# Patient Record
Sex: FEMALE | Race: WHITE
[De-identification: names, ages, dates, MRNs, and addresses within clinical notes are randomized per-mention and may not be internally consistent; named-entity substitution may affect disease eponyms.]

---

## 2017-07-05 ENCOUNTER — HOSPITAL ENCOUNTER (EMERGENCY)
Dept: HOSPITAL 57 - BURERS | Age: 37
LOS: 1 days | Discharge: LEFT BEFORE DISCHARGE | End: 2017-07-06
Payer: COMMERCIAL

## 2017-07-05 DIAGNOSIS — F17.210: ICD-10-CM

## 2017-07-05 DIAGNOSIS — R20.0: Primary | ICD-10-CM

## 2017-07-05 DIAGNOSIS — F41.9: ICD-10-CM

## 2017-07-05 LAB
ALBUMIN SERPL BCG-MCNC: 4.3 G/DL (ref 3.5–5)
ALP SERPL-CCNC: 64 U/L (ref 40–150)
ALT SERPL W P-5'-P-CCNC: 15 U/L (ref 8–55)
ANION GAP SERPL CALC-SCNC: 16 MMOL/L (ref 10–20)
APTT PPP: 34 SEC (ref 22.9–36.1)
AST SERPL-CCNC: 14 U/L (ref 5–34)
BACTERIA UR QL AUTO: (no result) HPF
BASOPHILS # BLD AUTO: 0.1 THOU/UL (ref 0–0.2)
BASOPHILS NFR BLD AUTO: 0.8 % (ref 0–1)
BILIRUB SERPL-MCNC: 0.3 MG/DL (ref 0.2–1.2)
BUN SERPL-MCNC: 8 MG/DL (ref 7–18.7)
CALCIUM SERPL-MCNC: 9.7 MG/DL (ref 7.8–10.44)
CHLORIDE SERPL-SCNC: 104 MMOL/L (ref 98–107)
CO2 SERPL-SCNC: 24 MMOL/L (ref 22–29)
CREAT CL PREDICTED SERPL C-G-VRATE: 0 ML/MIN (ref 70–130)
EOSINOPHIL # BLD AUTO: 0.7 THOU/UL (ref 0–0.7)
EOSINOPHIL NFR BLD AUTO: 4.5 % (ref 0–10)
GLOBULIN SER CALC-MCNC: 3.4 G/DL (ref 2.4–3.5)
GLUCOSE SERPL-MCNC: 91 MG/DL (ref 70–105)
HGB BLD-MCNC: 15.3 G/DL (ref 12–16)
INR PPP: 1.1
LYMPHOCYTES # BLD AUTO: 5.6 THOU/UL (ref 1.2–3.4)
LYMPHOCYTES NFR BLD AUTO: 35.8 % (ref 21–51)
MCH RBC QN AUTO: 31.6 PG (ref 27–31)
MCV RBC AUTO: 93 FL (ref 81–99)
MONOCYTES # BLD AUTO: 1.2 THOU/UL (ref 0.11–0.59)
MONOCYTES NFR BLD AUTO: 7.6 % (ref 0–10)
NEUTROPHILS # BLD AUTO: 8.1 THOU/UL (ref 1.4–6.5)
NEUTROPHILS NFR BLD AUTO: 51.3 % (ref 42–75)
PLATELET # BLD AUTO: 329 THOU/UL (ref 130–400)
POTASSIUM SERPL-SCNC: 3.8 MMOL/L (ref 3.5–5.1)
PREGS CONTROL BACKGROUND?: (no result)
PREGS CONTROL BAR APPEAR?: YES
PROTHROMBIN TIME: 14.3 SEC (ref 12–14.7)
RBC # BLD AUTO: 4.85 MILL/UL (ref 4.2–5.4)
RBC UR QL AUTO: (no result) HPF (ref 0–3)
SODIUM SERPL-SCNC: 140 MMOL/L (ref 136–145)
SP GR UR STRIP: 1.01 (ref 1–1.04)
WBC # BLD AUTO: 15.7 THOU/UL (ref 4.8–10.8)
WBC UR QL AUTO: (no result) HPF (ref 0–3)

## 2017-07-05 PROCEDURE — 71275 CT ANGIOGRAPHY CHEST: CPT

## 2017-07-05 PROCEDURE — 84703 CHORIONIC GONADOTROPIN ASSAY: CPT

## 2017-07-05 PROCEDURE — 81015 MICROSCOPIC EXAM OF URINE: CPT

## 2017-07-05 PROCEDURE — 93005 ELECTROCARDIOGRAM TRACING: CPT

## 2017-07-05 PROCEDURE — 84484 ASSAY OF TROPONIN QUANT: CPT

## 2017-07-05 PROCEDURE — 85025 COMPLETE CBC W/AUTO DIFF WBC: CPT

## 2017-07-05 PROCEDURE — 36416 COLLJ CAPILLARY BLOOD SPEC: CPT

## 2017-07-05 PROCEDURE — 82553 CREATINE MB FRACTION: CPT

## 2017-07-05 PROCEDURE — 96374 THER/PROPH/DIAG INJ IV PUSH: CPT

## 2017-07-05 PROCEDURE — 80053 COMPREHEN METABOLIC PANEL: CPT

## 2017-07-05 PROCEDURE — 70450 CT HEAD/BRAIN W/O DYE: CPT

## 2017-07-05 PROCEDURE — 85730 THROMBOPLASTIN TIME PARTIAL: CPT

## 2017-07-05 PROCEDURE — 85610 PROTHROMBIN TIME: CPT

## 2017-07-05 PROCEDURE — 81003 URINALYSIS AUTO W/O SCOPE: CPT

## 2017-07-05 NOTE — CT
CT OF THE BRAIN WITHOUT CONTRAST:

7/5/17

 

A noncontrast CT was done for evaluation of facial and hand numbness. The ventricles are normal in s
ize with no shift. No intracranial bleeding, mass or sign of acute stroke was found. There is good g
ray-white differentiation. The calvarium appears normal. The sphenoid sinus and mastoid air cells ar
e clear. 

 

IMPRESSION:  

No acute intracranial findings. 

 

POS: HOME

## 2017-07-06 LAB
CK MB SERPL-MCNC: 0.5 NG/ML (ref 0–6.6)
TROPONIN I SERPL DL<=0.01 NG/ML-MCNC: (no result) NG/ML (ref ?–0.03)

## 2017-08-25 ENCOUNTER — HOSPITAL ENCOUNTER (EMERGENCY)
Dept: HOSPITAL 57 - BURERS | Age: 37
Discharge: HOME | End: 2017-08-25
Payer: COMMERCIAL

## 2017-08-25 DIAGNOSIS — S60.462A: Primary | ICD-10-CM
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
## 2017-08-25 DIAGNOSIS — F17.210: ICD-10-CM

## 2017-08-25 DIAGNOSIS — F41.9: ICD-10-CM

## 2017-08-25 PROCEDURE — 99282 EMERGENCY DEPT VISIT SF MDM: CPT

## 2018-11-24 ENCOUNTER — HOSPITAL ENCOUNTER (EMERGENCY)
Dept: HOSPITAL 57 - BURERS | Age: 38
Discharge: HOME | End: 2018-11-24
Payer: COMMERCIAL

## 2018-11-24 DIAGNOSIS — F17.210: ICD-10-CM

## 2018-11-24 DIAGNOSIS — J98.01: Primary | ICD-10-CM

## 2018-11-24 DIAGNOSIS — Z71.6: ICD-10-CM

## 2018-11-24 DIAGNOSIS — F41.9: ICD-10-CM

## 2018-11-24 PROCEDURE — 87804 INFLUENZA ASSAY W/OPTIC: CPT

## 2018-11-24 PROCEDURE — 99406 BEHAV CHNG SMOKING 3-10 MIN: CPT

## 2018-11-24 PROCEDURE — 94640 AIRWAY INHALATION TREATMENT: CPT

## 2018-11-24 PROCEDURE — 96372 THER/PROPH/DIAG INJ SC/IM: CPT

## 2019-09-07 ENCOUNTER — HOSPITAL ENCOUNTER (EMERGENCY)
Dept: HOSPITAL 57 - BURERS | Age: 39
Discharge: HOME | End: 2019-09-07
Payer: COMMERCIAL

## 2019-09-07 DIAGNOSIS — S91.332A: Primary | ICD-10-CM
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
## 2019-09-07 DIAGNOSIS — F32.9: ICD-10-CM

## 2019-09-07 DIAGNOSIS — F17.210: ICD-10-CM

## 2019-09-07 PROCEDURE — 90715 TDAP VACCINE 7 YRS/> IM: CPT

## 2019-09-07 PROCEDURE — 90471 IMMUNIZATION ADMIN: CPT

## 2020-02-01 ENCOUNTER — HOSPITAL ENCOUNTER (EMERGENCY)
Dept: HOSPITAL 57 - BURERS | Age: 40
Discharge: HOME | End: 2020-02-01
Payer: SELF-PAY

## 2020-02-01 DIAGNOSIS — M25.562: Primary | ICD-10-CM
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
## 2020-02-01 DIAGNOSIS — F32.9: ICD-10-CM

## 2020-02-01 DIAGNOSIS — F17.210: ICD-10-CM

## 2020-02-01 NOTE — RAD
LEFT KNEE FOUR VIEWS:

2/1/20

 

No fracture was seen. A joint effusion is present. The articular surfaces are smooth.

 

IMPRESSION: 

Small joint effusion. 

 

POS: HOME

## 2020-11-04 ENCOUNTER — HOSPITAL ENCOUNTER (OUTPATIENT)
Dept: HOSPITAL 92 - LABBT | Age: 40
Discharge: HOME | End: 2020-11-04
Attending: OBSTETRICS & GYNECOLOGY
Payer: COMMERCIAL

## 2020-11-04 DIAGNOSIS — N92.1: ICD-10-CM

## 2020-11-04 DIAGNOSIS — Z20.828: ICD-10-CM

## 2020-11-04 DIAGNOSIS — Z01.818: Primary | ICD-10-CM

## 2020-11-04 LAB
HGB BLD-MCNC: 14.8 G/DL (ref 12–16)
MCH RBC QN AUTO: 30.6 PG (ref 27–33)
MCV RBC AUTO: 92.1 FL (ref 80–100)
PLATELET # BLD AUTO: 349 10X3/UL (ref 130–400)
PREGS CONTROL BACKGROUND?: (no result)
PREGS CONTROL BAR APPEAR?: YES
RBC # BLD AUTO: 4.83 10X6/UL (ref 3.9–5.2)
WBC # BLD AUTO: 9.3 10X3/UL (ref 4.5–11)

## 2020-11-04 PROCEDURE — U0003 INFECTIOUS AGENT DETECTION BY NUCLEIC ACID (DNA OR RNA); SEVERE ACUTE RESPIRATORY SYNDROME CORONAVIRUS 2 (SARS-COV-2) (CORONAVIRUS DISEASE [COVID-19]), AMPLIFIED PROBE TECHNIQUE, MAKING USE OF HIGH THROUGHPUT TECHNOLOGIES AS DESCRIBED BY CMS-2020-01-R: HCPCS

## 2020-11-04 PROCEDURE — 87635 SARS-COV-2 COVID-19 AMP PRB: CPT

## 2020-11-04 PROCEDURE — 84703 CHORIONIC GONADOTROPIN ASSAY: CPT

## 2020-11-04 PROCEDURE — 85027 COMPLETE CBC AUTOMATED: CPT

## 2020-11-09 ENCOUNTER — HOSPITAL ENCOUNTER (OUTPATIENT)
Dept: HOSPITAL 92 - SDC | Age: 40
Discharge: HOME | End: 2020-11-09
Attending: OBSTETRICS & GYNECOLOGY
Payer: COMMERCIAL

## 2020-11-09 VITALS — BODY MASS INDEX: 39.6 KG/M2

## 2020-11-09 DIAGNOSIS — E66.9: ICD-10-CM

## 2020-11-09 DIAGNOSIS — F17.210: ICD-10-CM

## 2020-11-09 DIAGNOSIS — Z88.5: ICD-10-CM

## 2020-11-09 DIAGNOSIS — A63.0: ICD-10-CM

## 2020-11-09 DIAGNOSIS — N88.8: ICD-10-CM

## 2020-11-09 DIAGNOSIS — Z79.899: ICD-10-CM

## 2020-11-09 DIAGNOSIS — N83.12: Primary | ICD-10-CM

## 2020-11-09 DIAGNOSIS — M19.90: ICD-10-CM

## 2020-11-09 DIAGNOSIS — N80.0: ICD-10-CM

## 2020-11-09 PROCEDURE — 86900 BLOOD TYPING SEROLOGIC ABO: CPT

## 2020-11-09 PROCEDURE — S0028 INJECTION, FAMOTIDINE, 20 MG: HCPCS

## 2020-11-09 PROCEDURE — 0UT54ZZ RESECTION OF RIGHT FALLOPIAN TUBE, PERCUTANEOUS ENDOSCOPIC APPROACH: ICD-10-PCS | Performed by: OBSTETRICS & GYNECOLOGY

## 2020-11-09 PROCEDURE — 0UT94ZZ RESECTION OF UTERUS, PERCUTANEOUS ENDOSCOPIC APPROACH: ICD-10-PCS | Performed by: OBSTETRICS & GYNECOLOGY

## 2020-11-09 PROCEDURE — 0UT24ZZ RESECTION OF BILATERAL OVARIES, PERCUTANEOUS ENDOSCOPIC APPROACH: ICD-10-PCS | Performed by: OBSTETRICS & GYNECOLOGY

## 2020-11-09 PROCEDURE — 86850 RBC ANTIBODY SCREEN: CPT

## 2020-11-09 PROCEDURE — 88307 TISSUE EXAM BY PATHOLOGIST: CPT

## 2020-11-09 PROCEDURE — 86901 BLOOD TYPING SEROLOGIC RH(D): CPT

## 2020-11-09 PROCEDURE — S0020 INJECTION, BUPIVICAINE HYDRO: HCPCS

## 2020-11-10 NOTE — OP
DATE OF PROCEDURE:  11/09/2020



PREOPERATIVE DIAGNOSES:  

1. Pelvic pain.

2. Menorrhagia.

3. Human papilloma virus.



POSTOPERATIVE DIAGNOSES:  

1. Pelvic pain.

2. Menorrhagia.

3. Human papilloma virus.



PROCEDURE PERFORMED:  Robotic-assisted total laparoscopic hysterectomy, left

salpingo-oophorectomy, and right salpingectomy. 



ANESTHESIA:  General endotracheal.



ASSISTANT SURGEON:  Ximena Villalta PA-C



ESTIMATED BLOOD LOSS:  20 mL.



IVF:  1500 mL of crystalloid.



URINE OUTPUT:  250 mL clear urine.



PATHOLOGY:  Uterus, cervix, bilateral fallopian tubes, and left ovary.



FINDINGS:  The uterus measured 8 cm.  Normal-appearing cervix and vaginal mucosa.

Physiologic adhesions of the sigmoid to the pelvic sidewall on the left.  Normal

left and right ovaries and fallopian tubes.  Concluding the procedure, there was

excellent hemostasis and ureter and bladders were noted to be intact. 



COMPLICATIONS:  None.



DRAINS:  Conner catheter.



DESCRIPTION OF PROCEDURE:  The patient was taken to the operating room, where

general anesthesia was obtained without difficulty.  The patient was prepped and

draped in a sterile fashion in dorsal lithotomy position.  A Conner catheter was

placed in the bladder.  A speculum was placed in the vagina.  The anterior lip of

the cervix was grasped with a single-tooth tenaculum.  The cervix was then

progressively dilated with Robert dilators and the uterus then sounded to 8 cm.  The

CLAUDIA manipulator was assembled with a 4 cm colpotomizer ring and an 8 cm tip.  The

manipulator was inserted into the uterus.  The tenaculum and speculum were removed

out of the vagina.  Balloons were inflated and colpotomizer ring was advanced, fit

snugly around the cervix.  Legs were placed in low lithotomy.  Attention was turned

to the abdomen.  A mixture of 0.5% Marcaine and 1% lidocaine with epi were

infiltrated into the umbilicus.  A 12 mm skin incision was made.  A Veress needle

was passed into the abdomen several times, initially getting high pressures.  On the

secondary try, the pressure was noted to be 2 mmHg and pneumoperitoneum was

obtained.  A 12 mm trocar was advanced into the abdomen and confirmed placement with

a robotic camera.  Steep Trendelenburg was obtained.  Right and left lower quadrant

8 mm robotic trocars were placed under direct visualization after infiltrating with

anesthetic mixture.  A right upper quadrant 11 mm port was also placed under direct

visualization after infiltrating with anesthetic.  The robot was then docked.  The

right robotic arm contained monopolar scissors.  Left robotic arm contained a

fenestrated bipolar.  During initial insufflation and visualization with the consult

under control, there was noted to be intermittent preperitoneal insufflation and

this was because the balloon on the camera trocar was actually inflated in the

preperitoneal space.  Therefore, the camera was undocked and the trocar was advanced

further into the abdomen to secure its place intraperitoneally and this resolved the

problem.  At that time, the left fallopian tube and ovary were grasped and elevated.

 The IP ligament was identified.  The physiologic adhesions that were filmy to the

pelvic brim and sidewall were taken down to further visualize the IP at the pelvic

brim as well as the ureter, which was done so easily.  The IP was then cauterized

just below the ovary multiple times and incised with the scissors on cautery and the

mesovarium was cauterized with a fenestrator and incised with the scissors until it

met the midportion of the round ligament that was cauterized and then incised.  The

posterior leaf of the broad ligament was incised down to the uterosacral.  The

retroperitoneum was bluntly brushed away from the uterine vessels.  The anterior

leaf of the broad ligament was also incised down to the level of the vesicouterine

peritoneum and the vessels on the right side were adequately skeletonized with

excellent visualization of the vessels themselves as well as identifying the ureter

in the pelvic sidewall where it crossed underneath the uterine artery. 



Attention was turned to the right side where the right fallopian tube was grasped

and elevated and just below the fallopian tube was cauterized and incised with the

scissors and this was done so from lateral to medial until the medial portion was

met of the fallopian tube that was clamped across, cauterized, incised, and then

removed out of the abdomen.  The utero-ovarian on the right side was cauterized

multiple times and incised with the scissors sequentially down to the midportion of

the round ligament that was cauterized and incised.  The anterior leaf of the broad

ligament was incised as well as posteriorly.  Again, the retroperitoneum was

dissected off this side with brushing and sweeping motion with a fenestrator and

then the vessels were adequately skeletonized for excellent visualization to achieve

hemostasis easily.  The ureter was then identified in the pelvic sidewall on the

right side and following this, the bladder flap was created ensuring a clear window

by undermining with the fenestrator and then incising with the scissors.  The

bladder easily was taken down off the lower uterine segment and the pubocervical

fascia carefully incising only supporting structures, not the actual bladder.  Once

this had been done below the colpotomizer ring, the pubocervical fascia was scored

on with the scissors on cautery and further abdomen tissue was dissected down below

the colpotomizer ring.  At that point, the uterine pedicle on both sides was

cauterized multiple times with a fenestrator.  Colpotomy was then performed and once

the vascular pedicle was met, this was then incised just above the internal os and

then taken down by incising on its medial portion where the cardinal ligament was to

allow a pedicle to not be retracted and sutured excellent hemostasis.  The colpotomy

was then completed and the uterus was pulled back into the vagina as a means to

maintain pneumoperitoneum.  The scissors were traded out for the needle  and

the vaginal cuff was irrigated copiously and hemostasis was achieved with a

fenestrator.  The vaginal cuff was then reapproximated with a barbed 2-0 PDS suture

in a running fashion incorporating vaginal mucosa and posterior peritoneum in each

bite and this was also run back for a second layer with the same suture.  The needle

was then cut and removed out of the abdomen.  Irrigation was performed of the pelvis

copiously and there was some oozing over the bladder flap and therefore FloSeal was

called for.  FloSeal was then placed over the vaginal cuff and the areas on the

bladder flap that had been oozing.  However, by this time, they were hemostatic.

Low pressure check was performed and noted to have excellent hemostasis.  The

ureters were noted to be intact and vermiculating in the sidewall as well as the

bladder.  All instruments were then removed out of the abdomen and pneumoperitoneum

was released.  The robot was undocked.  The fascia of the umbilical port was

reapproximated with 0 Vicryl in a figure-of-eight fashion.  The skin was closed of

all the sites with 4-0 Monocryl in a subcuticular fashion.  Dermabond was applied.

All instruments and specimens were removed out of the vagina.  The vaginal cuff was

checked and noted to be hemostatic.  The patient tolerated the procedure well.

Sponge, lap, and needle counts were correct x2.  The patient was taken to recovery

room in stable condition.  The patient received Ancef 2 g prior to the procedure. 







Job ID:  [id number]

## 2021-03-15 ENCOUNTER — HOSPITAL ENCOUNTER (OUTPATIENT)
Dept: HOSPITAL 92 - BICCT | Age: 41
Discharge: HOME | End: 2021-03-15
Attending: OBSTETRICS & GYNECOLOGY
Payer: COMMERCIAL

## 2021-03-15 DIAGNOSIS — R10.9: Primary | ICD-10-CM

## 2021-03-15 PROCEDURE — 74177 CT ABD & PELVIS W/CONTRAST: CPT

## 2021-03-22 ENCOUNTER — HOSPITAL ENCOUNTER (OUTPATIENT)
Dept: HOSPITAL 92 - LABBT | Age: 41
Discharge: HOME | End: 2021-03-22
Attending: OBSTETRICS & GYNECOLOGY
Payer: COMMERCIAL

## 2021-03-22 DIAGNOSIS — Z20.822: ICD-10-CM

## 2021-03-22 DIAGNOSIS — Z01.812: Primary | ICD-10-CM

## 2021-03-22 LAB
HGB BLD-MCNC: 16.3 G/DL (ref 12–15.5)
MCH RBC QN AUTO: 31.7 PG (ref 27–33)
MCV RBC AUTO: 94 FL (ref 81.6–98.3)
PLATELET # BLD AUTO: 321 10X3/UL (ref 150–450)
PREGS CONTROL BACKGROUND?: (no result)
PREGS CONTROL BAR APPEAR?: YES
RBC # BLD AUTO: 5.14 10X6/UL (ref 3.9–5.03)
WBC # BLD AUTO: 12.2 10X3/UL (ref 3.5–10.5)

## 2021-03-22 PROCEDURE — U0003 INFECTIOUS AGENT DETECTION BY NUCLEIC ACID (DNA OR RNA); SEVERE ACUTE RESPIRATORY SYNDROME CORONAVIRUS 2 (SARS-COV-2) (CORONAVIRUS DISEASE [COVID-19]), AMPLIFIED PROBE TECHNIQUE, MAKING USE OF HIGH THROUGHPUT TECHNOLOGIES AS DESCRIBED BY CMS-2020-01-R: HCPCS

## 2021-03-22 PROCEDURE — U0005 INFEC AGEN DETEC AMPLI PROBE: HCPCS

## 2021-03-22 PROCEDURE — 84703 CHORIONIC GONADOTROPIN ASSAY: CPT

## 2021-03-22 PROCEDURE — 87635 SARS-COV-2 COVID-19 AMP PRB: CPT

## 2021-03-22 PROCEDURE — 86900 BLOOD TYPING SEROLOGIC ABO: CPT

## 2021-03-22 PROCEDURE — 85027 COMPLETE CBC AUTOMATED: CPT

## 2021-03-22 PROCEDURE — 86850 RBC ANTIBODY SCREEN: CPT

## 2021-03-22 PROCEDURE — 86901 BLOOD TYPING SEROLOGIC RH(D): CPT

## 2021-03-24 ENCOUNTER — HOSPITAL ENCOUNTER (OUTPATIENT)
Dept: HOSPITAL 92 - SDC | Age: 41
Discharge: HOME | End: 2021-03-24
Attending: OBSTETRICS & GYNECOLOGY
Payer: COMMERCIAL

## 2021-03-24 VITALS — BODY MASS INDEX: 39.1 KG/M2

## 2021-03-24 DIAGNOSIS — F17.210: ICD-10-CM

## 2021-03-24 DIAGNOSIS — N83.201: ICD-10-CM

## 2021-03-24 DIAGNOSIS — Z88.1: ICD-10-CM

## 2021-03-24 DIAGNOSIS — Z90.710: ICD-10-CM

## 2021-03-24 DIAGNOSIS — Z88.5: ICD-10-CM

## 2021-03-24 DIAGNOSIS — Z79.899: ICD-10-CM

## 2021-03-24 DIAGNOSIS — N94.89: Primary | ICD-10-CM

## 2021-03-24 DIAGNOSIS — N73.6: ICD-10-CM

## 2021-03-24 DIAGNOSIS — Z88.2: ICD-10-CM

## 2021-03-24 PROCEDURE — S0028 INJECTION, FAMOTIDINE, 20 MG: HCPCS

## 2021-03-24 PROCEDURE — 0UB44ZX EXCISION OF UTERINE SUPPORTING STRUCTURE, PERCUTANEOUS ENDOSCOPIC APPROACH, DIAGNOSTIC: ICD-10-PCS | Performed by: OBSTETRICS & GYNECOLOGY

## 2021-03-24 PROCEDURE — 88312 SPECIAL STAINS GROUP 1: CPT

## 2021-03-24 PROCEDURE — 0DNU4ZZ RELEASE OMENTUM, PERCUTANEOUS ENDOSCOPIC APPROACH: ICD-10-PCS | Performed by: OBSTETRICS & GYNECOLOGY

## 2021-03-24 PROCEDURE — 88305 TISSUE EXAM BY PATHOLOGIST: CPT

## 2021-03-24 PROCEDURE — S0020 INJECTION, BUPIVICAINE HYDRO: HCPCS

## 2021-06-08 ENCOUNTER — HOSPITAL ENCOUNTER (OUTPATIENT)
Dept: HOSPITAL 92 - LABBT | Age: 41
Discharge: HOME | End: 2021-06-08
Attending: SURGERY
Payer: COMMERCIAL

## 2021-06-08 DIAGNOSIS — Z20.822: ICD-10-CM

## 2021-06-08 DIAGNOSIS — Z01.812: Primary | ICD-10-CM

## 2021-06-08 DIAGNOSIS — K40.90: ICD-10-CM

## 2021-06-08 LAB
BASOPHILS # BLD AUTO: 0.1 10X3/UL (ref 0–0.2)
BASOPHILS NFR BLD AUTO: 0.8 % (ref 0–2)
EOSINOPHIL # BLD AUTO: 0.6 10X3/UL (ref 0–0.5)
EOSINOPHIL NFR BLD AUTO: 5.9 % (ref 0–6)
HGB BLD-MCNC: 15 G/DL (ref 12–15.5)
LYMPHOCYTES NFR BLD AUTO: 41.4 % (ref 18–47)
MCH RBC QN AUTO: 31.5 PG (ref 27–33)
MCV RBC AUTO: 94.1 FL (ref 81.6–98.3)
MONOCYTES # BLD AUTO: 0.8 10X3/UL (ref 0–1.1)
MONOCYTES NFR BLD AUTO: 7.8 % (ref 0–10)
NEUTROPHILS # BLD AUTO: 4.6 10X3/UL (ref 1.5–8.4)
NEUTROPHILS NFR BLD AUTO: 43.8 % (ref 40–75)
PLATELET # BLD AUTO: 337 10X3/UL (ref 150–450)
RBC # BLD AUTO: 4.76 10X6/UL (ref 3.9–5.03)
WBC # BLD AUTO: 10.4 10X3/UL (ref 3.5–10.5)

## 2021-06-08 PROCEDURE — U0005 INFEC AGEN DETEC AMPLI PROBE: HCPCS

## 2021-06-08 PROCEDURE — U0003 INFECTIOUS AGENT DETECTION BY NUCLEIC ACID (DNA OR RNA); SEVERE ACUTE RESPIRATORY SYNDROME CORONAVIRUS 2 (SARS-COV-2) (CORONAVIRUS DISEASE [COVID-19]), AMPLIFIED PROBE TECHNIQUE, MAKING USE OF HIGH THROUGHPUT TECHNOLOGIES AS DESCRIBED BY CMS-2020-01-R: HCPCS

## 2021-06-08 PROCEDURE — 85025 COMPLETE CBC W/AUTO DIFF WBC: CPT

## 2021-06-11 ENCOUNTER — HOSPITAL ENCOUNTER (OUTPATIENT)
Dept: HOSPITAL 92 - SDC | Age: 41
Discharge: HOME | End: 2021-06-11
Attending: SURGERY
Payer: COMMERCIAL

## 2021-06-11 VITALS — BODY MASS INDEX: 39.6 KG/M2

## 2021-06-11 DIAGNOSIS — K43.6: ICD-10-CM

## 2021-06-11 DIAGNOSIS — Z88.2: ICD-10-CM

## 2021-06-11 DIAGNOSIS — K43.9: ICD-10-CM

## 2021-06-11 DIAGNOSIS — F17.210: ICD-10-CM

## 2021-06-11 DIAGNOSIS — Z79.899: ICD-10-CM

## 2021-06-11 DIAGNOSIS — M19.90: ICD-10-CM

## 2021-06-11 DIAGNOSIS — Z88.5: ICD-10-CM

## 2021-06-11 DIAGNOSIS — Z91.030: ICD-10-CM

## 2021-06-11 DIAGNOSIS — K40.90: Primary | ICD-10-CM

## 2021-06-11 PROCEDURE — C1781 MESH (IMPLANTABLE): HCPCS

## 2021-06-11 PROCEDURE — 0WQF4ZZ REPAIR ABDOMINAL WALL, PERCUTANEOUS ENDOSCOPIC APPROACH: ICD-10-PCS | Performed by: SURGERY

## 2021-06-11 PROCEDURE — S0020 INJECTION, BUPIVICAINE HYDRO: HCPCS

## 2021-06-11 PROCEDURE — 0YU64JZ SUPPLEMENT LEFT INGUINAL REGION WITH SYNTHETIC SUBSTITUTE, PERCUTANEOUS ENDOSCOPIC APPROACH: ICD-10-PCS | Performed by: SURGERY

## 2021-06-11 PROCEDURE — S0028 INJECTION, FAMOTIDINE, 20 MG: HCPCS

## 2022-03-25 ENCOUNTER — HOSPITAL ENCOUNTER (OUTPATIENT)
Dept: HOSPITAL 57 - BURRAD | Age: 42
Discharge: HOME | End: 2022-03-25
Payer: COMMERCIAL

## 2022-03-25 DIAGNOSIS — R06.00: Primary | ICD-10-CM

## 2022-03-25 PROCEDURE — 71046 X-RAY EXAM CHEST 2 VIEWS: CPT

## 2022-03-31 ENCOUNTER — HOSPITAL ENCOUNTER (INPATIENT)
Dept: HOSPITAL 92 - CSHTELE | Age: 42
LOS: 2 days | Discharge: HOME | DRG: 247 | End: 2022-04-02
Attending: INTERNAL MEDICINE | Admitting: EMERGENCY MEDICINE
Payer: COMMERCIAL

## 2022-03-31 VITALS — BODY MASS INDEX: 37.8 KG/M2

## 2022-03-31 DIAGNOSIS — F32.A: ICD-10-CM

## 2022-03-31 DIAGNOSIS — I21.4: Primary | ICD-10-CM

## 2022-03-31 DIAGNOSIS — Z88.5: ICD-10-CM

## 2022-03-31 DIAGNOSIS — E78.5: ICD-10-CM

## 2022-03-31 DIAGNOSIS — F41.9: ICD-10-CM

## 2022-03-31 DIAGNOSIS — Z71.6: ICD-10-CM

## 2022-03-31 DIAGNOSIS — Z98.890: ICD-10-CM

## 2022-03-31 DIAGNOSIS — Z88.1: ICD-10-CM

## 2022-03-31 DIAGNOSIS — Z90.710: ICD-10-CM

## 2022-03-31 DIAGNOSIS — I25.10: ICD-10-CM

## 2022-03-31 DIAGNOSIS — Z82.49: ICD-10-CM

## 2022-03-31 DIAGNOSIS — J44.9: ICD-10-CM

## 2022-03-31 DIAGNOSIS — I25.5: ICD-10-CM

## 2022-03-31 DIAGNOSIS — Z88.2: ICD-10-CM

## 2022-03-31 DIAGNOSIS — F17.210: ICD-10-CM

## 2022-03-31 LAB
TROPONIN I SERPL DL<=0.01 NG/ML-MCNC: 2.34 NG/ML (ref ?–0.03)
TROPONIN I SERPL DL<=0.01 NG/ML-MCNC: 2.42 NG/ML (ref ?–0.03)

## 2022-03-31 PROCEDURE — 85025 COMPLETE CBC W/AUTO DIFF WBC: CPT

## 2022-03-31 PROCEDURE — 93306 TTE W/DOPPLER COMPLETE: CPT

## 2022-03-31 PROCEDURE — C9600 PERC DRUG-EL COR STENT SING: HCPCS

## 2022-03-31 PROCEDURE — 36415 COLL VENOUS BLD VENIPUNCTURE: CPT

## 2022-03-31 PROCEDURE — C1725 CATH, TRANSLUMIN NON-LASER: HCPCS

## 2022-03-31 PROCEDURE — C1760 CLOSURE DEV, VASC: HCPCS

## 2022-03-31 PROCEDURE — 93010 ELECTROCARDIOGRAM REPORT: CPT

## 2022-03-31 PROCEDURE — G0378 HOSPITAL OBSERVATION PER HR: HCPCS

## 2022-03-31 PROCEDURE — C1753 CATH, INTRAVAS ULTRASOUND: HCPCS

## 2022-03-31 PROCEDURE — C1876 STENT, NON-COA/NON-COV W/DEL: HCPCS

## 2022-03-31 PROCEDURE — 83036 HEMOGLOBIN GLYCOSYLATED A1C: CPT

## 2022-03-31 PROCEDURE — C1874 STENT, COATED/COV W/DEL SYS: HCPCS

## 2022-03-31 PROCEDURE — C1769 GUIDE WIRE: HCPCS

## 2022-03-31 PROCEDURE — 96372 THER/PROPH/DIAG INJ SC/IM: CPT

## 2022-03-31 PROCEDURE — 99153 MOD SED SAME PHYS/QHP EA: CPT

## 2022-03-31 PROCEDURE — 92979 ENDOLUMINL IVUS OCT C EA: CPT

## 2022-03-31 PROCEDURE — C1887 CATHETER, GUIDING: HCPCS

## 2022-03-31 PROCEDURE — 97139 UNLISTED THERAPEUTIC PX: CPT

## 2022-03-31 PROCEDURE — 92978 ENDOLUMINL IVUS OCT C 1ST: CPT

## 2022-03-31 PROCEDURE — C1894 INTRO/SHEATH, NON-LASER: HCPCS

## 2022-03-31 PROCEDURE — 93458 L HRT ARTERY/VENTRICLE ANGIO: CPT

## 2022-03-31 PROCEDURE — 80061 LIPID PANEL: CPT

## 2022-03-31 PROCEDURE — 99152 MOD SED SAME PHYS/QHP 5/>YRS: CPT

## 2022-03-31 PROCEDURE — 93005 ELECTROCARDIOGRAM TRACING: CPT

## 2022-03-31 PROCEDURE — 80053 COMPREHEN METABOLIC PANEL: CPT

## 2022-03-31 PROCEDURE — 92928 PRQ TCAT PLMT NTRAC ST 1 LES: CPT

## 2022-04-01 LAB
CHD RISK SERPL-RTO: 7.1 (ref ?–4.5)
CHOLEST SERPL-MCNC: 264 MG/DL
HDLC SERPL-MCNC: 37 MG/DL
LDLC SERPL CALC-MCNC: 174 MG/DL
TRIGL SERPL-MCNC: 267 MG/DL (ref ?–150)

## 2022-04-01 PROCEDURE — 027136Z DILATION OF CORONARY ARTERY, TWO ARTERIES WITH THREE DRUG-ELUTING INTRALUMINAL DEVICES, PERCUTANEOUS APPROACH: ICD-10-PCS | Performed by: INTERNAL MEDICINE

## 2022-04-01 PROCEDURE — B241ZZ3 ULTRASONOGRAPHY OF MULTIPLE CORONARY ARTERIES, INTRAVASCULAR: ICD-10-PCS | Performed by: INTERNAL MEDICINE

## 2022-04-01 PROCEDURE — 4A023N7 MEASUREMENT OF CARDIAC SAMPLING AND PRESSURE, LEFT HEART, PERCUTANEOUS APPROACH: ICD-10-PCS | Performed by: INTERNAL MEDICINE

## 2022-04-01 PROCEDURE — B2111ZZ FLUOROSCOPY OF MULTIPLE CORONARY ARTERIES USING LOW OSMOLAR CONTRAST: ICD-10-PCS | Performed by: INTERNAL MEDICINE

## 2022-04-01 RX ADMIN — TICAGRELOR SCH MG: 90 TABLET ORAL at 21:18

## 2022-04-01 RX ADMIN — NITROGLYCERIN PRN MG: 0.4 TABLET SUBLINGUAL at 04:25

## 2022-04-01 RX ADMIN — ASPIRIN 81 MG CHEWABLE TABLET SCH MG: 81 TABLET CHEWABLE at 16:04

## 2022-04-01 RX ADMIN — NITROGLYCERIN PRN MG: 0.4 TABLET SUBLINGUAL at 02:24

## 2022-04-02 VITALS — TEMPERATURE: 98.8 F | DIASTOLIC BLOOD PRESSURE: 66 MMHG | SYSTOLIC BLOOD PRESSURE: 100 MMHG

## 2022-04-02 LAB
ALBUMIN SERPL BCG-MCNC: 3.3 G/DL (ref 3.5–5)
ALP SERPL-CCNC: 47 U/L (ref 40–110)
ALT SERPL W P-5'-P-CCNC: 16 U/L (ref 8–55)
ANION GAP SERPL CALC-SCNC: 12 MMOL/L (ref 10–20)
AST SERPL-CCNC: 22 U/L (ref 5–34)
BASOPHILS # BLD AUTO: 0 10X3/UL (ref 0–0.2)
BASOPHILS NFR BLD AUTO: 0.3 % (ref 0–2)
BILIRUB SERPL-MCNC: 0.5 MG/DL (ref 0.2–1.2)
BUN SERPL-MCNC: 9 MG/DL (ref 7–18.7)
CALCIUM SERPL-MCNC: 8.6 MG/DL (ref 7.8–10.44)
CHLORIDE SERPL-SCNC: 108 MMOL/L (ref 98–107)
CO2 SERPL-SCNC: 20 MMOL/L (ref 22–29)
CREAT CL PREDICTED SERPL C-G-VRATE: 168 ML/MIN (ref 70–130)
EOSINOPHIL # BLD AUTO: 0.5 10X3/UL (ref 0–0.5)
EOSINOPHIL NFR BLD AUTO: 5.3 % (ref 0–6)
GLOBULIN SER CALC-MCNC: 2.6 G/DL (ref 2.4–3.5)
GLUCOSE SERPL-MCNC: 88 MG/DL (ref 70–105)
HGB BLD-MCNC: 12 G/DL (ref 12–15.5)
LYMPHOCYTES NFR BLD AUTO: 31.6 % (ref 18–47)
MCH RBC QN AUTO: 31.4 PG (ref 27–33)
MCV RBC AUTO: 94.5 FL (ref 81.6–98.3)
MONOCYTES # BLD AUTO: 0.7 10X3/UL (ref 0–1.1)
MONOCYTES NFR BLD AUTO: 7.4 % (ref 0–10)
NEUTROPHILS # BLD AUTO: 5.1 10X3/UL (ref 1.5–8.4)
NEUTROPHILS NFR BLD AUTO: 55 % (ref 40–75)
PLATELET # BLD AUTO: 306 10X3/UL (ref 150–450)
POTASSIUM SERPL-SCNC: 3.8 MMOL/L (ref 3.5–5.1)
RBC # BLD AUTO: 3.82 10X6/UL (ref 3.9–5.03)
SODIUM SERPL-SCNC: 136 MMOL/L (ref 136–145)
WBC # BLD AUTO: 9.2 10X3/UL (ref 3.5–10.5)

## 2022-04-02 RX ADMIN — ASPIRIN 81 MG CHEWABLE TABLET SCH MG: 81 TABLET CHEWABLE at 08:30

## 2022-04-02 RX ADMIN — TICAGRELOR SCH MG: 90 TABLET ORAL at 08:30

## 2022-04-02 RX ADMIN — NITROGLYCERIN PRN MG: 0.4 TABLET SUBLINGUAL at 03:15

## 2022-04-13 ENCOUNTER — HOSPITAL ENCOUNTER (EMERGENCY)
Dept: HOSPITAL 57 - BURERS | Age: 42
Discharge: ANOTHER HOSPITAL | End: 2022-04-13
Payer: COMMERCIAL

## 2022-04-13 DIAGNOSIS — J44.9: ICD-10-CM

## 2022-04-13 DIAGNOSIS — Z95.5: ICD-10-CM

## 2022-04-13 DIAGNOSIS — Z79.899: ICD-10-CM

## 2022-04-13 DIAGNOSIS — R94.31: ICD-10-CM

## 2022-04-13 DIAGNOSIS — F17.210: ICD-10-CM

## 2022-04-13 DIAGNOSIS — Z20.822: ICD-10-CM

## 2022-04-13 DIAGNOSIS — I25.2: ICD-10-CM

## 2022-04-13 DIAGNOSIS — R07.89: Primary | ICD-10-CM

## 2022-04-13 DIAGNOSIS — Z79.82: ICD-10-CM

## 2022-04-13 LAB
ALBUMIN SERPL BCG-MCNC: 4.1 G/DL (ref 3.5–5)
ALP SERPL-CCNC: 61 U/L (ref 40–110)
ALT SERPL W P-5'-P-CCNC: 13 U/L (ref 8–55)
ANION GAP SERPL CALC-SCNC: 13 MMOL/L (ref 10–20)
AST SERPL-CCNC: 11 U/L (ref 5–34)
BASOPHILS # BLD AUTO: 0.1 THOU/UL (ref 0–0.2)
BASOPHILS NFR BLD AUTO: 1 % (ref 0–1)
BILIRUB SERPL-MCNC: 0.5 MG/DL (ref 0.2–1.2)
BUN SERPL-MCNC: 9 MG/DL (ref 7–18.7)
CALCIUM SERPL-MCNC: 9.7 MG/DL (ref 7.8–10.44)
CHLORIDE SERPL-SCNC: 108 MMOL/L (ref 98–107)
CO2 SERPL-SCNC: 21 MMOL/L (ref 22–29)
CREAT CL PREDICTED SERPL C-G-VRATE: 0 ML/MIN (ref 70–130)
EOSINOPHIL # BLD AUTO: 0.4 THOU/UL (ref 0–0.7)
EOSINOPHIL NFR BLD AUTO: 4 % (ref 0–10)
GLOBULIN SER CALC-MCNC: 3.5 G/DL (ref 2.4–3.5)
GLUCOSE SERPL-MCNC: 105 MG/DL (ref 70–105)
HGB BLD-MCNC: 14.5 G/DL (ref 12–16)
LYMPHOCYTES # BLD AUTO: 3.6 THOU/UL (ref 1.2–3.4)
LYMPHOCYTES NFR BLD AUTO: 34.4 % (ref 21–51)
MCH RBC QN AUTO: 32.1 PG (ref 27–31)
MCV RBC AUTO: 97.6 FL (ref 78–98)
MONOCYTES # BLD AUTO: 0.8 THOU/UL (ref 0.11–0.59)
MONOCYTES NFR BLD AUTO: 7.9 % (ref 0–10)
NEUTROPHILS # BLD AUTO: 5.5 THOU/UL (ref 1.4–6.5)
NEUTROPHILS NFR BLD AUTO: 52.7 % (ref 42–75)
PLATELET # BLD AUTO: 386 THOU/UL (ref 130–400)
POTASSIUM SERPL-SCNC: 4 MMOL/L (ref 3.5–5.1)
RBC # BLD AUTO: 4.52 MILL/UL (ref 4.2–5.4)
SODIUM SERPL-SCNC: 138 MMOL/L (ref 136–145)
TROPONIN I SERPL DL<=0.01 NG/ML-MCNC: (no result) NG/ML (ref ?–0.03)
WBC # BLD AUTO: 10.3 THOU/UL (ref 4.8–10.8)

## 2022-04-13 PROCEDURE — 71045 X-RAY EXAM CHEST 1 VIEW: CPT

## 2022-04-13 PROCEDURE — 84484 ASSAY OF TROPONIN QUANT: CPT

## 2022-04-13 PROCEDURE — U0005 INFEC AGEN DETEC AMPLI PROBE: HCPCS

## 2022-04-13 PROCEDURE — 94760 N-INVAS EAR/PLS OXIMETRY 1: CPT

## 2022-04-13 PROCEDURE — U0003 INFECTIOUS AGENT DETECTION BY NUCLEIC ACID (DNA OR RNA); SEVERE ACUTE RESPIRATORY SYNDROME CORONAVIRUS 2 (SARS-COV-2) (CORONAVIRUS DISEASE [COVID-19]), AMPLIFIED PROBE TECHNIQUE, MAKING USE OF HIGH THROUGHPUT TECHNOLOGIES AS DESCRIBED BY CMS-2020-01-R: HCPCS

## 2022-04-13 PROCEDURE — 96374 THER/PROPH/DIAG INJ IV PUSH: CPT

## 2022-04-13 PROCEDURE — 80053 COMPREHEN METABOLIC PANEL: CPT

## 2022-04-13 PROCEDURE — 36415 COLL VENOUS BLD VENIPUNCTURE: CPT

## 2022-04-13 PROCEDURE — 85025 COMPLETE CBC W/AUTO DIFF WBC: CPT

## 2022-04-13 PROCEDURE — 83880 ASSAY OF NATRIURETIC PEPTIDE: CPT

## 2022-04-13 PROCEDURE — 93005 ELECTROCARDIOGRAM TRACING: CPT

## 2022-07-24 ENCOUNTER — HOSPITAL ENCOUNTER (EMERGENCY)
Dept: HOSPITAL 57 - BURERS | Age: 42
Discharge: HOME | End: 2022-07-24
Payer: COMMERCIAL

## 2022-07-24 DIAGNOSIS — I25.2: ICD-10-CM

## 2022-07-24 DIAGNOSIS — Z79.899: ICD-10-CM

## 2022-07-24 DIAGNOSIS — U07.1: Primary | ICD-10-CM

## 2022-07-24 DIAGNOSIS — J44.9: ICD-10-CM

## 2022-07-24 PROCEDURE — 99283 EMERGENCY DEPT VISIT LOW MDM: CPT

## 2022-07-24 PROCEDURE — 87804 INFLUENZA ASSAY W/OPTIC: CPT

## 2022-07-24 PROCEDURE — U0005 INFEC AGEN DETEC AMPLI PROBE: HCPCS

## 2022-07-24 PROCEDURE — U0003 INFECTIOUS AGENT DETECTION BY NUCLEIC ACID (DNA OR RNA); SEVERE ACUTE RESPIRATORY SYNDROME CORONAVIRUS 2 (SARS-COV-2) (CORONAVIRUS DISEASE [COVID-19]), AMPLIFIED PROBE TECHNIQUE, MAKING USE OF HIGH THROUGHPUT TECHNOLOGIES AS DESCRIBED BY CMS-2020-01-R: HCPCS

## 2022-09-02 ENCOUNTER — HOSPITAL ENCOUNTER (EMERGENCY)
Dept: HOSPITAL 57 - BURERS | Age: 42
Discharge: HOME | End: 2022-09-02
Payer: COMMERCIAL

## 2022-09-02 DIAGNOSIS — F17.210: ICD-10-CM

## 2022-09-02 DIAGNOSIS — K43.9: Primary | ICD-10-CM

## 2022-09-02 DIAGNOSIS — Z79.82: ICD-10-CM

## 2022-09-02 DIAGNOSIS — Z79.899: ICD-10-CM

## 2022-09-02 PROCEDURE — 99283 EMERGENCY DEPT VISIT LOW MDM: CPT

## 2023-10-09 ENCOUNTER — HOSPITAL ENCOUNTER (OUTPATIENT)
Dept: HOSPITAL 57 - BURRAD | Age: 43
Discharge: HOME | End: 2023-10-09
Payer: COMMERCIAL

## 2023-10-09 DIAGNOSIS — M54.41: Primary | ICD-10-CM

## 2023-10-09 PROCEDURE — 72100 X-RAY EXAM L-S SPINE 2/3 VWS: CPT

## 2024-05-24 ENCOUNTER — HOSPITAL ENCOUNTER (OUTPATIENT)
Dept: HOSPITAL 57 - BURRAD | Age: 44
Discharge: HOME | End: 2024-05-24
Payer: COMMERCIAL

## 2024-05-24 DIAGNOSIS — M25.551: Primary | ICD-10-CM

## 2025-07-30 ENCOUNTER — HOSPITAL ENCOUNTER (EMERGENCY)
Dept: HOSPITAL 57 - BURERS | Age: 45
End: 2025-07-30
Payer: COMMERCIAL

## 2025-07-30 DIAGNOSIS — Z55.6: ICD-10-CM

## 2025-07-30 DIAGNOSIS — R07.89: Primary | ICD-10-CM

## 2025-07-30 DIAGNOSIS — J44.9: ICD-10-CM

## 2025-07-30 DIAGNOSIS — I25.2: ICD-10-CM

## 2025-07-30 DIAGNOSIS — F17.210: ICD-10-CM

## 2025-07-30 LAB
ALBUMIN SERPL BCG-MCNC: 4.1 G/DL (ref 3.1–4.5)
ALBUMIN/GLOB SERPL: 1.1 G/DL (ref 1.2–2.2)
ALP SERPL-CCNC: 64 U/L (ref 40–110)
ALT SERPL W P-5'-P-CCNC: 12 U/L (ref ?–34)
ANION GAP SERPL CALC-SCNC: 18 MMOL/L (ref 10–20)
ANISOCYTOSIS BLD QL SMEAR: (no result) (100X)
AST SERPL-CCNC: 16 U/L (ref 11–34)
AUER BODIES BLD QL SMEAR: (no result)
BASO STIPL BLD QL SMEAR: (no result) (100X)
BASOPHILS # BLD AUTO: 0.1 THOU/UL (ref 0–0.2)
BASOPHILS NFR BLD AUTO: 0.9 % (ref 0–1)
BASOPHILS NFR BLD MANUAL: (no result) % (ref 0–2)
BILIRUB SERPL-MCNC: 0.2 MG/DL (ref 0.3–1.2)
BITE CELLS BLD QL SMEAR: (no result) (100X)
BLASTS NFR BLD MANUAL: (no result) % (ref 0–0)
BLISTER CELLS BLD QL SMEAR: (no result) (100X)
BUN SERPL-MCNC: 10 MG/DL (ref 7–18.7)
BURR CELLS BLD QL SMEAR: (no result) (100X)
BURR CELLS BLD QL SMEAR: (no result) (100X)
CABOT RINGS BLD QL SMEAR: (no result) (100X)
CALCIUM SERPL-MCNC: 9.8 MG/DL (ref 7.8–10.44)
CHLORIDE SERPL-SCNC: 103 MMOL/L (ref 98–107)
CO2 SERPL-SCNC: 23 MMOL/L (ref 22–29)
COMM CRITICAL RESULTS DOC: (no result)
CREAT CL PREDICTED SERPL C-G-VRATE: 0 ML/MIN (ref 70–130)
CREAT SERPL-MCNC: 0.71 MG/DL (ref 0.5–1.1)
DACRYOCYTES BLD QL SMEAR: (no result) (100X)
DELETE AUTO DIFF??: (no result)
DOHLE BOD BLD QL SMEAR: (no result)
EGFRCR SERPLBLD CKD-EPI 2021: 105 ML/MIN/{1.73_M2}
ELLIPTOCYTES BLD QL SMEAR: (no result) (100X)
EOSINOPHIL # BLD AUTO: 0.9 THOU/UL (ref 0–0.7)
EOSINOPHIL NFR BLD AUTO: 7.6 % (ref 0–10)
EOSINOPHIL NFR BLD MANUAL: (no result) % (ref 0–10)
ERYTHROCYTE [DISTWIDTH] IN BLOOD BY AUTOMATED COUNT: 12.2 % (ref 11.5–14.5)
GIANT PLATELETS BLD QL SMEAR: (no result) HPF (ref 0–5)
GLOBULIN SER CALC-MCNC: 3.7 G/DL (ref 2.4–3.5)
GLUCOSE SERPL-MCNC: 90 MG/DL (ref 70–105)
HCT VFR BLD CALC: 43.5 % (ref 36–47)
HELMET CELLS BLD QL SMEAR: (no result) (100X)
HGB BLD-MCNC: 15.1 G/DL (ref 12–16)
HOWELL-JOLLY BOD BLD QL SMEAR: (no result) (100X)
HYPOCHROMIA BLD QL SMEAR: (no result) (100X)
LG PLATELETS BLD QL SMEAR: (no result)
LYMPHOCYTES # BLD AUTO: 4.9 THOU/UL (ref 1.2–3.4)
LYMPHOCYTES NFR BLD AUTO: 42.5 % (ref 21–51)
LYMPHOCYTES NFR BLD MANUAL: (no result) % (ref 21–51)
Lab: (no result) (100X)
MACROCYTES BLD QL SMEAR: (no result) (100X)
MANUAL DIF COMMENT BLD-IMP: (no result)
MANUAL DIF COMMENT BLD-IMP: (no result)
MANUAL DIFF??: (no result)
MCH RBC QN AUTO: 31.7 PG (ref 27–31)
MCHC RBC AUTO-ENTMCNC: 34.7 G/DL (ref 32–36)
MCV RBC AUTO: 91.4 FL (ref 78–98)
MDIFF COMPLETE?: (no result)
METAMYELOCYTES NFR BLD MANUAL: (no result) % (ref 0–0)
MICROCYTES BLD QL SMEAR: (no result) (100X)
MONOCYTES # BLD AUTO: 0.6 THOU/UL (ref 0.11–0.59)
MONOCYTES NFR BLD AUTO: 5.3 % (ref 0–10)
MONOCYTES NFR BLD MANUAL: (no result) % (ref 0–10)
MYELOCYTES NFR BLD MANUAL: (no result) % (ref 0–0)
NEUTROPHILS # BLD AUTO: 5 THOU/UL (ref 1.4–6.5)
NEUTROPHILS NFR BLD AUTO: 43.7 % (ref 42–75)
NEUTS BAND NFR BLD MANUAL: (no result) % (ref 5–11)
NEUTS HYPERSEG BLD QL SMEAR: (no result)
NEUTS SEG NFR BLD MANUAL: (no result) % (ref 42–75)
NRBC # BLD: (no result) %
OVALOCYTES BLD QL SMEAR: (no result) (100X)
PAPPENHEIMER BOD BLD QL SMEAR: (no result) (100X)
PLASMACOID LYMPHS NFR BLD MANUAL: (no result) % (ref 0–0)
PLATELET # BLD AUTO: 389 10X3/UL (ref 130–400)
PLATELET BLD QL SMEAR: (no result)
PLATELET CLUMP BLD QL SMEAR: (no result)
PLATELET SATEL BLD QL SMEAR: (no result)
PMV BLD AUTO: 6.6 FL (ref 7.4–10.4)
POIKILOCYTOSIS BLD QL SMEAR: (no result) (100X)
POLYCHROMASIA BLD QL SMEAR: (no result) (100X)
POTASSIUM SERPL-SCNC: 3.7 MMOL/L (ref 3.5–5.1)
PROMYELOCYTES NFR BLD MANUAL: (no result) % (ref 0–0)
PROT SERPL-MCNC: 7.8 G/DL (ref 6–8.3)
RBC # BLD AUTO: 4.76 MILL/UL (ref 4.2–5.4)
REFLEX FOR REVIEW??: (no result)
ROULEAUX BLD QL SMEAR: (no result) (100X)
SCHISTOCYTES BLD QL SMEAR: (no result) (100X)
SICKLE CELLS BLD QL SMEAR: (no result) (100X)
SMALL PLATELETS BLD QL SMEAR: (no result) HPF (ref 0–15)
SMUDGE CELLS BLD QL SMEAR: (no result)
SODIUM SERPL-SCNC: 140 MMOL/L (ref 136–145)
SPHEROCYTES BLD QL SMEAR: (no result) (100X)
STOMATOCYTES BLD QL SMEAR: (no result) (100X)
TARGETS BLD QL SMEAR: (no result) (100X)
TOXIC GRANULES BLD QL SMEAR: (no result)
TROPONIN I SERPL DL<=0.01 NG/ML-MCNC: (no result) NG/ML (ref ?–0.03)
VARIANT LYMPHS NFR BLD MANUAL: (no result) % (ref 0–10)
WBC # BLD AUTO: 11.5 10X3/UL (ref 4.8–10.8)
WBC OTHER NFR BLD MANUAL: (no result) %
WBC TOXIC VACUOLES BLD QL SMEAR: (no result)

## 2025-07-30 PROCEDURE — 84484 ASSAY OF TROPONIN QUANT: CPT

## 2025-07-30 PROCEDURE — 80053 COMPREHEN METABOLIC PANEL: CPT

## 2025-07-30 PROCEDURE — 93005 ELECTROCARDIOGRAM TRACING: CPT

## 2025-07-30 PROCEDURE — 71045 X-RAY EXAM CHEST 1 VIEW: CPT

## 2025-07-30 PROCEDURE — 85025 COMPLETE CBC W/AUTO DIFF WBC: CPT

## 2025-07-30 SDOH — EDUCATIONAL SECURITY - EDUCATION ATTAINMENT: PROBLEMS RELATED TO HEALTH LITERACY: Z55.6
